# Patient Record
Sex: FEMALE | Race: WHITE | Employment: STUDENT | URBAN - METROPOLITAN AREA
[De-identification: names, ages, dates, MRNs, and addresses within clinical notes are randomized per-mention and may not be internally consistent; named-entity substitution may affect disease eponyms.]

---

## 2017-07-31 NOTE — PROGRESS NOTES
[address]  [facility] [address]  [phone]  Dept: [phone]    PRE-OP EVALUATION:  Today's date: 2017    Jing Rowan (: 1995) presents for pre-operative evaluation assessment as requested by Dr. Lozoya.  She requires evaluation and anesthesia risk assessment prior to undergoing surgery/procedure for treatment of left elbow/forearm .  Proposed procedure:     Date of Surgery/ Procedure: 8/3/17  Time of Surgery/ Procedure:  7 am   Hospital/Surgical Facility: [facility]   Fax number for surgical facility:   Primary Physician: Deshaun Alejo  Type of Anesthesia Anticipated: to be determined    Patient has a Health Care Directive or Living Will:  NO    Preop Questions 2017   1.  Do you have a history of heart attack, stroke, stent, bypass or surgery on an artery in the head, neck, heart or legs? No   2.  Do you ever have any pain or discomfort in your chest? No   3.  Do you have a history of  Heart Failure? No   4.   Are you troubled by shortness of breath when:  walking on a level surface, or up a slight hill, or at night? No   5.  Do you currently have a cold, bronchitis or other respiratory infection? No   6.  Do you have a cough, shortness of breath, or wheezing? No   7.  Do you sometimes get pains in the calves of your legs when you walk? No   8. Do you or anyone in your family have previous history of blood clots? No   9.  Do you or does anyone in your family have a serious bleeding problem such as prolonged bleeding following surgeries or cuts? No   10. Have you ever had problems with anemia or been told to take iron pills? No   11. Have you had any abnormal blood loss such as black, tarry or bloody stools, or abnormal vaginal bleeding? No   12. Have you ever had a blood transfusion? No   13. Have you or any of your relatives ever had problems with anesthesia? No   14. Do you have sleep apnea, excessive snoring or daytime drowsiness? No    15. Do you have any prosthetic heart valves? No   16. Do you have prosthetic joints? No   17. Is there any chance that you may be pregnant? No           HPI:                                                      Brief HPI related to upcoming procedure: pt had fracture of her displaced  Ulnar fracture of her left arm while arm wrestling with her cousin about a yr ago needing plate and screw suregry about a yr ago. The fracture has not healed and needing a revision surgery.      See problem list for active medical problems.  Problems all longstanding and stable, except as noted/documented.  See ROS for pertinent symptoms related to these conditions.                                                                                                  .    MEDICAL HISTORY:                                                    Patient Active Problem List    Diagnosis Date Noted     ADHD (attention deficit hyperactivity disorder), combined type 12/12/2014     Priority: Medium     Hyperlipidemia LDL goal <130 12/12/2011     Priority: Medium     Recheck 12/12       Hypertriglyceridemia 08/10/2011     Priority: Medium     Recheck 12/12       Drug abuse in remission      Priority: Medium     CD treatment, alcohol  (Problem list name updated by automated process. Provider to review and confirm.)        Past Medical History:   Diagnosis Date     Drug abuse, in remission 2008    CD treatment, alcohol     Past Surgical History:   Procedure Laterality Date     NO HISTORY OF SURGERY       Current Outpatient Prescriptions   Medication Sig Dispense Refill     order for DME Equipment being ordered: thumb spica wrist splint (Patient not taking: Reported on 8/1/2017) 1 each 0     OTC products: None, except as noted above    Allergies   Allergen Reactions     No Known Drug Allergies       Latex Allergy: NO    Social History   Substance Use Topics     Smoking status: Current Some Day Smoker     Packs/day: 0.50     Types: Cigarettes     Last  attempt to quit: 2/1/2016     Smokeless tobacco: Never Used     Alcohol use No      Comment: Prior, in drug treatment (2/2012)  Denies use during pregnancy  (3/2014)     History   Drug Use No     Comment: states previous marijuana use       REVIEW OF SYSTEMS:                                                    Constitutional, neuro, ENT, endocrine, pulmonary, cardiac, gastrointestinal, genitourinary, musculoskeletal, integument and psychiatric systems are negative, except as otherwise noted.      EXAM:                                                    /70 (BP Location: Right arm, Patient Position: Chair, Cuff Size: Adult Regular)  Pulse 76  Temp 98.5  F (36.9  C) (Oral)  Resp 16  Wt 170 lb (77.1 kg)  SpO2 97%  BMI 25.18 kg/m2    GENERAL APPEARANCE: healthy, alert and no distress     EYES: EOMI, PERRL     HENT: ear canals and TM's normal and nose and mouth without ulcers or lesions     NECK: no adenopathy, no asymmetry, masses, or scars and thyroid normal to palpation     RESP: lungs clear to auscultation - no rales, rhonchi or wheezes     CV: regular rates and rhythm, normal S1 S2, no S3 or S4 and no murmur, click or rub     ABDOMEN:  soft, nontender, no HSM or masses and bowel sounds normal     MS: extremities normal- no gross deformities noted, no evidence of inflammation in joints, FROM in all extremities.     SKIN: no suspicious lesions or rashes     NEURO: Normal strength and tone, sensory exam grossly normal, mentation intact and speech normal     PSYCH: mentation appears normal. and affect normal/bright     LYMPHATICS: No axillary, cervical, or supraclavicular nodes    DIAGNOSTICS:                                                    EKG: Not indicated due to non-vascular surgery and low risk of event (age <65 and without cardiac risk factors)    Recent Labs   Lab Test  03/24/14   1605  02/27/12   2255   HGB  12.7  13.1   PLT  239  264        IMPRESSION:                                                     Reason for surgery/procedure: Nonunion of the fracture   Diagnosis/reason for consult: Pre-op eval    The proposed surgical procedure is considered INTERMEDIATE risk.    REVISED CARDIAC RISK INDEX  The patient has the following serious cardiovascular risks for perioperative complications such as (MI, PE, VFib and 3  AV Block):  No serious cardiac risks  INTERPRETATION: 0 risks: Class I (very low risk - 0.4% complication rate)    The patient has the following additional risks for perioperative complications:  No identified additional risks  Alcohol abuse in remission   Tobacco use      ICD-10-CM    1. 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 Tobacco use disorder F17.200 TOBACCO CESSATION ORDER FOR      Hemoglobin     Creatinine   3. Preop general physical exam Z01.818 Hemoglobin     Creatinine       RECOMMENDATIONS:                                                        --Patient is to take all scheduled medications on the day of surgery EXCEPT for modifications listed below.    Anticoagulant or Antiplatelet Medication Use  NSAIDS: Ibuprofen (Motrin):         Stop one week prior to surgery    Advised quitting smoking to improve healing          APPROVAL GIVEN to proceed with proposed procedure, without further diagnostic evaluation       Signed Electronically by: Andreea Penaloza MD    Copy of this evaluation report is provided to requesting physician.    [facility] Preop Guidelines

## 2017-08-01 ENCOUNTER — OFFICE VISIT (OUTPATIENT)
Dept: FAMILY MEDICINE | Facility: OTHER | Age: 22
End: 2017-08-01
Payer: COMMERCIAL

## 2017-08-01 VITALS
HEART RATE: 76 BPM | SYSTOLIC BLOOD PRESSURE: 114 MMHG | BODY MASS INDEX: 25.18 KG/M2 | RESPIRATION RATE: 16 BRPM | WEIGHT: 170 LBS | DIASTOLIC BLOOD PRESSURE: 70 MMHG | TEMPERATURE: 98.5 F | OXYGEN SATURATION: 97 %

## 2017-08-01 DIAGNOSIS — Z01.818 PREOP GENERAL PHYSICAL EXAM: ICD-10-CM

## 2017-08-01 DIAGNOSIS — S52.002K: Primary | ICD-10-CM

## 2017-08-01 DIAGNOSIS — F17.200 TOBACCO USE DISORDER: ICD-10-CM

## 2017-08-01 LAB — HGB BLD-MCNC: 13.7 G/DL (ref 11.7–15.7)

## 2017-08-01 PROCEDURE — 36415 COLL VENOUS BLD VENIPUNCTURE: CPT | Performed by: FAMILY MEDICINE

## 2017-08-01 PROCEDURE — 82565 ASSAY OF CREATININE: CPT | Performed by: FAMILY MEDICINE

## 2017-08-01 PROCEDURE — 99214 OFFICE O/P EST MOD 30 MIN: CPT | Performed by: FAMILY MEDICINE

## 2017-08-01 PROCEDURE — 85018 HEMOGLOBIN: CPT | Performed by: FAMILY MEDICINE

## 2017-08-01 ASSESSMENT — PAIN SCALES - GENERAL: PAINLEVEL: SEVERE PAIN (6)

## 2017-08-01 NOTE — NURSING NOTE
"Chief Complaint   Patient presents with     Pre-Op Exam     Panel Management     Tdap, Chlamydia, Tobacco cessation, Lipid screening       Initial /70 (BP Location: Right arm, Patient Position: Chair, Cuff Size: Adult Regular)  Pulse 76  Temp 98.5  F (36.9  C) (Oral)  Resp 16  Wt 170 lb (77.1 kg)  SpO2 97%  BMI 25.18 kg/m2 Estimated body mass index is 25.18 kg/(m^2) as calculated from the following:    Height as of 3/29/16: 5' 8.9\" (1.75 m).    Weight as of this encounter: 170 lb (77.1 kg).  Medication Reconciliation: complete    "

## 2017-08-01 NOTE — MR AVS SNAPSHOT
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 This includes signs of a cold or flu (such as a sore throat, runny nose, cough, rash or fever).    Do not smoke, drink alcohol or take over the counter medicine (unless your surgeon or primary care doctor tells you to) for the 24 hours before and after surgery.    If you take prescribed drugs: Follow your doctor s orders about which medicines to take and which to stop until after surgery.    Eating and drinking prior to surgery: follow the instructions from your surgeon    Take a shower or bath the night before surgery. Use the soap your surgeon gave you to gently clean your skin. If you do not have soap from your surgeon, use your regular soap. Do not shave or scrub the surgery site.  Wear clean pajamas and have clean sheets on your bed.           Follow-ups after your visit        Follow-up notes from your care team     Return if symptoms worsen or fail to improve.      Who to contact     If you have questions or need follow up information about today's clinic visit or your schedule please contact [facility] directly at [phone].  Normal or non-critical lab and imaging results will be communicated to you by MyChart, letter or phone within 4 business days after the clinic has received the results. If you do not hear from us within 7 days, please contact the clinic through MyChart or phone. If you have a  critical or abnormal lab result, we will notify you by phone as soon as possible.  Submit refill requests through [facility] or call your pharmacy and they will forward the refill request to us. Please allow 3 business days for your refill to be completed.          Additional Information About Your Visit        Care EveryWhere ID     This is your Care EveryWhere ID. This could be used by other organizations to access your [facility] medical records  [id number]        Your Vitals Were     Pulse Temperature Respirations Pulse Oximetry BMI (Body Mass Index)       76 98.5  F (36.9  C) (Oral) 16 97% 25.18 kg/m2        Blood Pressure from Last 3 Encounters:   08/01/17 114/70   05/12/16 128/60   02/08/16 120/84    Weight from Last 3 Encounters:   08/01/17 170 lb (77.1 kg)   06/28/16 157 lb (71.2 kg)   05/12/16 157 lb (71.2 kg)              We Performed the Following     Creatinine     Hemoglobin     TOBACCO CESSATION ORDER FOR         Primary Care Provider Office Phone # Fax #    Deshaun Jj Alejo [phone]       [address]   [facility] [address]        Equal Access to Services     [facility] : Hadii aad ku hadasho Soomaali, waaxda luqadaha, qaybta kaalmada adeegyada, mohini hugoin hayjamiln olimpia liu ah. So [facility] [phone].    ATENCIÓN: Si habla español, tiene a dallas disposición servicios gratuitos de asistencia lingüística. Llame al [phone].    We comply with applicable federal civil rights laws and [place] laws. We do not discriminate on the basis of race, color, national origin, age, disability sex, sexual orientation or gender identity.            Thank you!     Thank you for choosing [facility]  for your care. Our goal is always to provide you with excellent care. Hearing back from our patients is one way we can continue to improve our services. Please take a few minutes to complete the written survey that you may receive in the mail  after your visit with us. Thank you!             Your Updated Medication List - Protect others around you: Learn how to safely use, store and throw away your medicines at www.disposemymeds.org.          This list is accurate as of: 8/1/17  4:22 PM.  Always use your most recent med list.                   Brand Name Dispense Instructions for use Diagnosis    order for DME     1 each    Equipment being ordered: thumb spica wrist splint    Wrist injury, left, initial encounter

## 2017-08-02 LAB
CREAT SERPL-MCNC: 0.76 MG/DL (ref 0.52–1.04)
GFR SERPL CREATININE-BSD FRML MDRD: NORMAL ML/MIN/1.7M2

## 2018-07-27 ENCOUNTER — TELEPHONE (OUTPATIENT)
Dept: FAMILY MEDICINE | Facility: CLINIC | Age: 23
End: 2018-07-27

## 2018-07-27 NOTE — TELEPHONE ENCOUNTER
Summary:    Patient is due/failing the following:   Physical, LDL and PAP    Action needed:   Patient needs office visit for PAP. and Patient needs fasting lab only appointment    Type of outreach:    Sent letter.    Questions for provider review:    None                                                                                                                                    Suri Butterfield       Chart routed to Care Team .        Panel Management Review    Patient has the following on her problem list: None      Composite cancer screening  Chart review shows that this patient is due/due soon for the following Pap Smear

## 2018-07-27 NOTE — LETTER
[address] [address]  Phone: [phone]  Fax: [phone]  July 27, 2018      Jing Rowan  [address] [facility] [address]      Dear Jing,    We care about your health and have reviewed your health plan including your medical conditions, medications, and lab results.  Based on this review, it is recommended that you follow up regarding the following health topic(s):  -Cervical Cancer Screening  -Wellness (Physical) Visit     We recommend you take the following action(s):  -schedule a WELLNESS (Physical) APPOINTMENT.  We will perform the following labs: Lipids (fasting cholesterol - nothing to eat except water and/or meds for 8-10 hours).  -schedule a PAP SMEAR EXAM which is due.  Please disregard this reminder if you have had this exam elsewhere within the last year.  It would be helpful for us to have a copy of your recent pap smear report to update your records.     Please call us at [phone]  (or use [employer]) to address the above recommendations.     Thank you for trusting [facility] and we appreciate the opportunity to serve you.  We look forward to supporting your healthcare needs in the future.    Healthy Regards,    Your Health Care Team  [facility]

## 2018-08-11 ENCOUNTER — HEALTH MAINTENANCE LETTER (OUTPATIENT)
Age: 23
End: 2018-08-11

## 2021-05-26 ENCOUNTER — TRANSFERRED RECORDS (OUTPATIENT)
Dept: HEALTH INFORMATION MANAGEMENT | Facility: CLINIC | Age: 26
End: 2021-05-26